# Patient Record
Sex: FEMALE | Race: WHITE | Employment: FULL TIME | URBAN - METROPOLITAN AREA
[De-identification: names, ages, dates, MRNs, and addresses within clinical notes are randomized per-mention and may not be internally consistent; named-entity substitution may affect disease eponyms.]

---

## 2022-12-22 ENCOUNTER — HOSPITAL ENCOUNTER (OUTPATIENT)
Dept: PHYSICAL THERAPY | Age: 57
Discharge: HOME | End: 2022-12-22
Payer: OTHER GOVERNMENT

## 2022-12-22 PROCEDURE — 97530 THERAPEUTIC ACTIVITIES: CPT

## 2022-12-22 PROCEDURE — 97162 PT EVAL MOD COMPLEX 30 MIN: CPT

## 2022-12-22 PROCEDURE — 97110 THERAPEUTIC EXERCISES: CPT

## 2022-12-22 NOTE — PROGRESS NOTES
[facility]  [address] [facility]  [facility], [address]  Ph [phone]  Fx [phone]    Plan of Care/ Statement of Necessity for Physical Therapy Services    Patient name: Agatha Lambert Start of Care: 2022   Referral source: Temo Powell MD : 1965    Medical Diagnosis: Other low back pain [M54.59]   Onset Date:6 months ago    Treatment Diagnosis: other low back pain                                              ICD-10: M54.59   Prior Hospitalization: see medical history Provider#: [id number]   Medications: Verified on Patient summary List    Comorbidities: asthma, HTN, shoulder issues (hx frozen shoulder); 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 Assessment/ key information: 55 yo female who [facility] to [facility] with c/o LBP. Patient reports that she has been dealing with the back pain for a couple years however but pain is now getting worse over the past  months. She is now having difficulty performing her job. Patient reports pain is central in low back and has occasionally has burning pain in upper thighs after a lot of walking. Pt reports difficulty with standing to wash dishes, difficulty with vacuuming, difficulty with standing for a long period of time, and difficulty with walking. 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 Pt had a lot of pain with all testing today and was unable to tolerate MMT past 3/5 due to pain. Pt also did not tolerate hip clearance tests due ot pain with all movements. Pt had increased paraspinal tightness from lumbar spine to thoracic spine and was tender to touch.   Patient demonstrates decreased lumbar ROM, decreased strength, impaired posture, impaired gait mechancis, pain and decreased functional mobility tolerance. Patient will continue to benefit from skilled PT services to modify and progress therapeutic interventions, address functional mobility deficits, address ROM deficits, address strength deficits, analyze and address soft tissue restrictions, analyze and cue movement patterns, analyze and modify body mechanics/ergonomics, assess and modify postural abnormalities, and instruct in home and community integration to attain remaining goals. Evaluation Complexity History HIGH Complexity :3+ comorbidities / personal factors will impact the outcome/ POC ; Examination HIGH Complexity : 4+ Standardized tests and measures addressing body structure, function, activity limitation and / or participation in recreation  ;Presentation MEDIUM Complexity : Evolving with changing characteristics  ; Clinical Decision Making MEDIUM Complexity : FOTO score of 26-74 : FOTO score = an established functional score where 100 = no disability  Overall Complexity Rating: MEDIUM    Problem List: pain affecting function, decrease ROM, decrease strength, edema affecting function, impaired gait/ balance, decrease ADL/ functional abilitiies, decrease activity tolerance, decrease flexibility/ joint mobility, and decrease transfer abilities   Treatment Plan may include any combination of the following: Therapeutic exercise, Neuromuscular reeducation, Manual therapy, Therapeutic activity, Self care/home management, Electric stim unattended , Vasopneumatic device, Aquatic therapy, Gait training, Ultrasound, Mechanical traction, Electric stim attended, Needle insertion w/o injection (1 or 2 muscles), and Needle insertion w/o injection (3+ muscles)  Patient / Family readiness to learn indicated by: asking questions, trying to perform skills, and interest  Persons(s) to be included in education: patient (P)  Barriers to Learning/Limitations: None  Measures taken if barriers to learning: NA  Patient Goal (s): I need to figure out what I can do and what I cant do; It would be great if it eased up some  Patient Self Reported Health Status: fair  Rehabilitation Potential: good    Short Term Goals: To be accomplished in 8 treatments:  Patient will be independent and compliant with HEP to progress toward goals and restore functional mobility. Eval Status: issued at eval     Patient will improve FOTO score by 5 points to improve functional tolerance for exercise. Eval Status: FOTO 42  FOTO score = an established functional score where 100 = no disability     Patient will improve pain in low back to 4/10 at worst to improve standing and bending tolerance and restore prior level of function. Eval Status: 8/10 at worst     Pt will have 3/5 left hip extension, knee flexion and hip abduction strength to return to goals of improved functional mobility and improved standing toelrance. Eval Status:     Left(0-5) Right (0-5)   Knee Flexion (S1,2) 2 P! 3 P! Abdominals 2     Hip extension 2 P! 3P! Gluteus Kevin 2 P! 3 P! Hip Abduction 2 P! 3 P! Pt will have painfree lumbar AROM WFL to aid in functional mechanics for ambulation/ADLs. Eval Status:   ROM % AROM Comments:pain, area   Forward flexion 40-60 20 cm to floor  Difficulty returning to stand P! Extension 20-30 25% P! SideBend right 20-30 58 P! SideBend left 20-30 60     Rotation right 5-10 50% P! Rotation left 5-10 WFL           Long Term Goals: To be accomplished in 16  treatments:  Patient will improve FOTO score by 11 points to improve functional tolerance for standing, lifting and bending. Eval Status: FOTO 42  FOTO score = an established functional score where 100 = no disability     2. Pt will be able to squat and lift 30 lbs with good form and no increase in pain in order to return to work with no increase in pain.   Eval Status: unable to perform with out increasing low back pain     3. Pt will have 4/5 lisbeth LE strength to return to goals of improved standing tolerance and squatting tolerance. Eval Status:     Left(0-5) Right (0-5)   Hip Flexion (L1,2) 3 P! 3 P! Knee Extension (L3,4) 3+ P! 3+ P! Ankle Dorsiflexion (L4) 3 P! 3 P! Knee Flexion (S1,2) 2 P! 3 P! Abdominals 2     Hip extension 2 P! 3P! Gluteus Kevin 2 P! 3 P! Hip Abduction 2 P! 3 P!         4. Patient will improve pain in low back to 1-2/10 at worst to improve standing tolerance and restore prior level of function. Eval Status: 8/10 at worst      Frequency / Duration: Patient to be seen 1-2 times per week for 16 treatments. I would like to see pt 2 times a week but she can not afford her copay twice a week. Pt given financial aide paperwork     Patient/ Caregiver education and instruction: Diagnosis, prognosis, self care, activity modification, and exercises   [x]  Plan of care has been reviewed with PTA    Certification Period: COURTNEY Marti, PT 12/22/2022 10:08 AM    ________________________________________________________________________    I certify that the above Therapy Services are being furnished while the patient is under my care. I agree with the treatment plan and certify that this therapy is necessary.     Physician's Signature:_____________________Date:____________TIME:________                                      Josselin Queen MD      ** Signature, Date and Time must be completed for valid certification **  Please sign and return to [facility]  [address] [place], [address]  Ph [phone]  Fx [phone]

## 2022-12-22 NOTE — PROGRESS NOTES
PT DAILY TREATMENT NOTE/ LUMBAR EVAL 10-18    Patient Name: Evangelina Morales  Date:2022  : 1965  [x]  Patient  Verified  Payor:  / Plan: Mp Amen / Product Type:  /    In time:1010  Out time:1100  Total Treatment Time (min): 50  Visit #: 1 of 16    Medicare/BCBS Only   Total Timed Codes (min):  23 1:1 Treatment Time:  50       Treatment Area: Other low back pain [M54.59]    SUBJECTIVE  Pain Level (0-10 scale): 5/10  [x]constant []intermittent []improving [x]worsening []no change since onset    Any medication changes, allergies to medications, adverse drug reactions, diagnosis change, or new procedure performed?: [x] No    [] Yes (see summary sheet for update)  Subjective functional status/changes:     PLOF: functionally independent, no AD, active lifestyle  Limitations to PLOF: difficulty with standing to wash dishes, difficulty with vacuuming, difficulty with standing for a long period of time. Difficulty with walking  Mechanism of Injury: Pt reports that she has been dealing with the back pain for a couple years however but pain is now getting worse over the past  months. She is now having difficulty performing her job. 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, lots of lifting twisting and bending   Living Situation: 1 story home with 2 [place]AYDER lives with son and   Pt Goals: \"I need to figure out what I can do and what I cant do;  It would be great if it eased up some\"  Barriers: []pain []financial []time []transportation []other  Motivation: good   Substance use: []Alcohol []Tobacco []other:   Cognition: A & O x 3 OBJECTIVE        27 min [x]Eval                  []Re-Eval       15 min Therapeutic Exercise:  [x] See flow sheet :   Rationale: increase ROM, increase strength, and improve coordination to improve the patients ability to restore PLOF    8 min Therapeutic Activity:  []  See flow sheet : log rolling technique to reduce back pain   Rationale: improve coordination  to improve the patients ability to perform bed mobility with out pain           With   [] TE   [] TA   [] neuro   [] other: Patient Education: [x] Review HEP    [] Progressed/Changed HEP based on:   [] positioning   [] body mechanics   [] transfers   [] heat/ice application    [] other:        General Evaluation    Physical Therapy Evaluation - Lumbar Spine  :    OBJECTIVE  Posture:  Lateral Shift: [] Right    [x] Left     [] +  [] -  scoliosis upper left   Kyphosis: [x] Increased [] Decreased   []  WNL  Lordosis:  [] Increased [] Decreased   [] WNL  Pelvic symmetry: [] WNL    [] Other:    Gait:  [] Normal     [x] Abnormal: decreased stance phase on left LE    Active Movements: [] N/A   [] Too acute   [] Other:  ROM % AROM Comments:pain, area   Forward flexion 40-60 20 cm to floor  Difficulty returning to stand P! Extension 20-30 25% P! SideBend right 20-30 58 P! SideBend left 20-30 60    Rotation right 5-10 50% P! Rotation left 5-10 WFL        Dural Mobility:  SLR Supine: [] Right    [] Left   [] +    [x] -  @ (degrees):   Slump Test: [] Right    [] Left   [] +    [x] -  @ (degrees):   Prone Knee Bend: [] Right    [] Left    [] +    [x] -     Palpation  [] Min  [x] Mod  [] Severe    Location:  [place] Paraspinals    Strength   Left(0-5) Right (0-5) N/T   Hip Flexion (L1,2) 3 P! 3 P! []   Knee Extension (L3,4) 3+ P! 3+ P! []   Ankle Dorsiflexion (L4) 3 P! 3 P! []   Knee Flexion (S1,2) 2 P! 3 P! []   Abdominals 2  []   Hip extension 2 P! 3P! []   Gluteus Kevin 2 P! 3 P! []   Hip Abduction 2 P! 3 P!  []         Special Tests Hip: Matthew Rey:  [] Right    [] Left    [] +    [] -     Scour:  [] Right    [] Left    [] +    [] -     Piriformis: [] Right    [] Left    [] +    [] -     Unable to accurately assess due to increased pain with all tests and movements. Other Tests / Comments: Hamstring tightness, Piriformis tightness, hip flexor tightness        Pain Level (0-10 scale) post treatment: 5/10    ASSESSMENT/Changes in Function: 57 yo female who [facility] to [facility] with c/o LBP. Patient reports that she has been dealing with the back pain for a couple years however but pain is now getting worse over the past  months. She is now having difficulty performing her job. Patient reports pain is central in low back and has occasionally has burning pain in upper thighs after a lot of walking. Pt reports difficulty with standing to wash dishes, difficulty with vacuuming, difficulty with standing for a long period of time, and difficulty with walking. 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 Pt had a lot of pain with all testing today and was unable to tolerate MMT past 3/5 due to pain. Pt also did not tolerate hip clearance tests due ot pain with all movements. Pt had increased paraspinal tightness from lumbar spine to thoracic spine and was tender to touch. Patient demonstrates decreased lumbar ROM, decreased strength, impaired posture, impaired gait mechancis, pain and decreased functional mobility tolerance. Patient will continue to benefit from skilled PT services to modify and progress therapeutic interventions, address functional mobility deficits, address ROM deficits, address strength deficits, analyze and address soft tissue restrictions, analyze and cue movement patterns, analyze and modify body mechanics/ergonomics, assess and modify postural abnormalities, and instruct in home and community integration to attain remaining goals.      [x]  See Plan of Care  [] See progress note/recertification  []  See Discharge Summary         Progress towards goals / Updated goals:  Short Term Goals: To be accomplished in 8 treatments:  Patient will be independent and compliant with HEP to progress toward goals and restore functional mobility. Eval Status: issued at eval    Patient will improve FOTO score by 5 points to improve functional tolerance for exercise. Eval Status: FOTO 42  FOTO score = an established functional score where 100 = no disability    Patient will improve pain in low back to 4/10 at worst to improve standing and bending tolerance and restore prior level of function. Eval Status: 8/10 at worst    Pt will have 3/5 left hip extension, knee flexion and hip abduction strength to return to goals of improved functional mobility and improved standing toelrance. Eval Status:    Left(0-5) Right (0-5)   Knee Flexion (S1,2) 2 P! 3 P! Abdominals 2    Hip extension 2 P! 3P! Gluteus Kevin 2 P! 3 P! Hip Abduction 2 P! 3 P! Pt will have painfree lumbar AROM WFL to aid in functional mechanics for ambulation/ADLs. Eval Status:   ROM % AROM Comments:pain, area   Forward flexion 40-60 20 cm to floor  Difficulty returning to stand P! Extension 20-30 25% P! SideBend right 20-30 58 P! SideBend left 20-30 60    Rotation right 5-10 50% P! Rotation left 5-10 WFL        Long Term Goals: To be accomplished in 16  treatments:  Patient will improve FOTO score by 11 points to improve functional tolerance for standing, lifting and bending. Eval Status: FOTO 42  FOTO score = an established functional score where 100 = no disability    2. Pt will be able to squat and lift 30 lbs with good form and no increase in pain in order to return to work with no increase in pain. Eval Status: unable to perform with out increasing low back pain    3. Pt will have 4/5 lisbeth LE strength to return to goals of improved standing tolerance and squatting tolerance.   Eval Status: Left(0-5) Right (0-5)   Hip Flexion (L1,2) 3 P! 3 P! Knee Extension (L3,4) 3+ P! 3+ P! Ankle Dorsiflexion (L4) 3 P! 3 P! Knee Flexion (S1,2) 2 P! 3 P! Abdominals 2    Hip extension 2 P! 3P! Gluteus Kevin 2 P! 3 P! Hip Abduction 2 P! 3 P!       4. Patient will improve pain in low back to 1-2/10 at worst to improve standing tolerance and restore prior level of function.   Eval Status: 8/10 at worst    PLAN  [x]  Upgrade activities as tolerated     [x]  Continue plan of care  [x]  Update interventions per flow sheet       []  Discharge due to:_  []  Other:_      Kurt Marti, PT 12/22/2022  10:08 AM

## 2022-12-28 ENCOUNTER — APPOINTMENT (OUTPATIENT)
Dept: PHYSICAL THERAPY | Age: 57
End: 2022-12-28
Payer: OTHER GOVERNMENT

## 2023-01-24 ENCOUNTER — TELEPHONE (OUTPATIENT)
Dept: PHYSICAL THERAPY | Age: 58
End: 2023-01-24

## 2023-05-13 ENCOUNTER — HOSPITAL ENCOUNTER (EMERGENCY)
Facility: HOSPITAL | Age: 58
Discharge: HOME | End: 2023-05-13
Attending: STUDENT IN AN ORGANIZED HEALTH CARE EDUCATION/TRAINING PROGRAM
Payer: OTHER GOVERNMENT

## 2023-05-13 VITALS
RESPIRATION RATE: 18 BRPM | TEMPERATURE: 97.5 F | HEIGHT: 69 IN | WEIGHT: 210 LBS | BODY MASS INDEX: 31.1 KG/M2 | OXYGEN SATURATION: 99 % | SYSTOLIC BLOOD PRESSURE: 127 MMHG | DIASTOLIC BLOOD PRESSURE: 72 MMHG | HEART RATE: 90 BPM

## 2023-05-13 DIAGNOSIS — T78.2XXA ANAPHYLAXIS, INITIAL ENCOUNTER: Primary | ICD-10-CM

## 2023-05-13 PROCEDURE — A4216 STERILE WATER/SALINE, 10 ML: HCPCS | Performed by: PHYSICIAN ASSISTANT

## 2023-05-13 PROCEDURE — 2500000003 HC RX 250 WO HCPCS: Performed by: PHYSICIAN ASSISTANT

## 2023-05-13 PROCEDURE — 99284 EMERGENCY DEPT VISIT MOD MDM: CPT

## 2023-05-13 PROCEDURE — 96375 TX/PRO/DX INJ NEW DRUG ADDON: CPT

## 2023-05-13 PROCEDURE — 96361 HYDRATE IV INFUSION ADD-ON: CPT

## 2023-05-13 PROCEDURE — 2580000003 HC RX 258: Performed by: PHYSICIAN ASSISTANT

## 2023-05-13 PROCEDURE — 96374 THER/PROPH/DIAG INJ IV PUSH: CPT

## 2023-05-13 PROCEDURE — 6360000002 HC RX W HCPCS: Performed by: PHYSICIAN ASSISTANT

## 2023-05-13 RX ORDER — EPINEPHRINE 0.3 MG/.3ML
0.3 INJECTION SUBCUTANEOUS ONCE
Qty: 1 EACH | Refills: 0 | Status: SHIPPED | OUTPATIENT
Start: 2023-05-13 | End: 2023-05-13

## 2023-05-13 RX ORDER — PREDNISONE 50 MG/1
50 TABLET ORAL DAILY
Qty: 5 TABLET | Refills: 0 | Status: SHIPPED | OUTPATIENT
Start: 2023-05-13 | End: 2023-05-18

## 2023-05-13 RX ORDER — METHYLPREDNISOLONE SODIUM SUCCINATE 125 MG/2ML
125 INJECTION, POWDER, LYOPHILIZED, FOR SOLUTION INTRAMUSCULAR; INTRAVENOUS
Status: COMPLETED | OUTPATIENT
Start: 2023-05-13 | End: 2023-05-13

## 2023-05-13 RX ORDER — DIPHENHYDRAMINE HYDROCHLORIDE 50 MG/ML
50 INJECTION INTRAMUSCULAR; INTRAVENOUS
Status: COMPLETED | OUTPATIENT
Start: 2023-05-13 | End: 2023-05-13

## 2023-05-13 RX ORDER — 0.9 % SODIUM CHLORIDE 0.9 %
1000 INTRAVENOUS SOLUTION INTRAVENOUS ONCE
Status: COMPLETED | OUTPATIENT
Start: 2023-05-13 | End: 2023-05-13

## 2023-05-13 RX ADMIN — METHYLPREDNISOLONE SODIUM SUCCINATE 125 MG: 125 INJECTION INTRAMUSCULAR; INTRAVENOUS at 18:06

## 2023-05-13 RX ADMIN — DIPHENHYDRAMINE HYDROCHLORIDE 50 MG: 50 INJECTION, SOLUTION INTRAMUSCULAR; INTRAVENOUS at 18:07

## 2023-05-13 RX ADMIN — SODIUM CHLORIDE 1000 ML: 9 INJECTION, SOLUTION INTRAVENOUS at 18:07

## 2023-05-13 RX ADMIN — FAMOTIDINE 20 MG: 10 INJECTION, SOLUTION INTRAVENOUS at 18:07

## 2023-05-13 NOTE — DISCHARGE INSTRUCTIONS
Your history is consistent with anaphylactic reaction  Avoid any shellfish at this time  Take steroid as prescribed  Benadryl, 25 mg, 2 every 6 hours for itch or rash  Pepcid, 20 mg, 1 tablet twice a day for itch or rash  EpiPen was prescribed.   If you develop a rash with a secondary symptoms such as lip, tongue or throat swelling, difficulty breathing, sudden vomiting, diarrhea, abdominal cramping you should take the EpiPen and call 911  Follow-up with your doctor early next week for reevaluation and further investigation  Return to ER if you develop any new or worsening symptoms or new concerns

## 2023-05-13 NOTE — ED NOTES
Patient resting on the stretcher at this time. Chest rise and fall noted. VSS. No further complaints at this time. Will continue to monitor according to facility protocol.        Rose Mai RN  05/13/23 [id number]

## 2023-05-13 NOTE — ED PROVIDER NOTES











SHAHRIAR ORLANDO 22, 2017 12:24 [address]    THE [facility] EMERGENCY DEPT  5/13/2023, 5:48 PM EDT    Clinical Impression:  1. Anaphylaxis, initial encounter        Assessment/Differential Diagnosis:     Ddx angioedema, allergic reaction, anaphylaxis all considered    ED Course:   Initial assessment performed. The patients presenting problems have been discussed, and they are in agreement with the care plan formulated and outlined with them. I have encouraged them to ask questions as they arise throughout their visit. Pt here with son with possible allergic reaction. PT states she had a haircut about 1pm with new powder placed to neck, noticed some irritation/itch. Had shrimp about 3pm, and noticed while eating she had parasthesia to her lips, and noticed pruritis to face, neck, arms. By 4pm pt was \"red all over\" with welts on face, and felt her lips were puffy and felt throat swelling, with more effort needed to swallow. [facility] to [facility], and pt was given IM epi and instructed to come to ED for further evaluation/treatment. Pt feels epi made the rash improve with hive like rash resolved, throat feeling better. No fever, headache, vision change, cough, wheeze, v/d or abd cramping. Now with minimal odd sensation in throat, no itch, no SOB. NO previous similar symptoms. NO new medications. Pt with history of HTN, DM, seasonal allergies. Exam with pt appears comfortable, in no resp distress, alert/oriented. Vitals with Tachy initially 130, currently 115. Skin with no rash. Face/lips appear normal. Tongue with sig swelling, uvula with swelling, midline, throat patent. No stridor, lungs clear, no wheeze or cough. Abd benign. Pt gives concerning story for anaphylaxis with improvement with epipen. Will give 1L NS with tachycardia, iv benadryl, pepcid and solumedrol. Will observe for 1-2 hours. Epipen given at 5pm per son.     6:46 PM EDT  Pt feeling better, uvula with

## 2023-05-13 NOTE — ED TRIAGE NOTES
Pt arrives to ed reporting an allergic reaction possibly to powder that was applied to the back of her neck around 2pm which was when the patient noticed itching. Pt did eat shrimp for lunch afterwards and began to have some numbness in her lip and her tongue began to swell. [facility] and was given epi IM.